# Patient Record
Sex: MALE | Race: OTHER | NOT HISPANIC OR LATINO
[De-identification: names, ages, dates, MRNs, and addresses within clinical notes are randomized per-mention and may not be internally consistent; named-entity substitution may affect disease eponyms.]

---

## 2017-11-09 ENCOUNTER — APPOINTMENT (OUTPATIENT)
Dept: ORTHOPEDIC SURGERY | Facility: CLINIC | Age: 77
End: 2017-11-09
Payer: MEDICARE

## 2017-11-09 DIAGNOSIS — Z96.651 PRESENCE OF RIGHT ARTIFICIAL KNEE JOINT: ICD-10-CM

## 2017-11-09 DIAGNOSIS — Z96.652 PRESENCE OF LEFT ARTIFICIAL KNEE JOINT: ICD-10-CM

## 2017-11-09 PROCEDURE — 73562 X-RAY EXAM OF KNEE 3: CPT | Mod: 50

## 2017-11-09 PROCEDURE — 99213 OFFICE O/P EST LOW 20 MIN: CPT

## 2019-02-28 ENCOUNTER — APPOINTMENT (OUTPATIENT)
Dept: MRI IMAGING | Facility: CLINIC | Age: 79
End: 2019-02-28
Payer: MEDICARE

## 2019-02-28 ENCOUNTER — OUTPATIENT (OUTPATIENT)
Dept: OUTPATIENT SERVICES | Facility: HOSPITAL | Age: 79
LOS: 1 days | End: 2019-02-28
Payer: MEDICARE

## 2019-02-28 DIAGNOSIS — Z96.652 PRESENCE OF LEFT ARTIFICIAL KNEE JOINT: Chronic | ICD-10-CM

## 2019-02-28 DIAGNOSIS — Z00.8 ENCOUNTER FOR OTHER GENERAL EXAMINATION: ICD-10-CM

## 2019-02-28 PROCEDURE — 73721 MRI JNT OF LWR EXTRE W/O DYE: CPT | Mod: 26,LT

## 2019-02-28 PROCEDURE — 73721 MRI JNT OF LWR EXTRE W/O DYE: CPT

## 2020-02-25 ENCOUNTER — APPOINTMENT (OUTPATIENT)
Dept: VASCULAR SURGERY | Facility: CLINIC | Age: 80
End: 2020-02-25
Payer: MEDICARE

## 2020-02-25 VITALS
DIASTOLIC BLOOD PRESSURE: 71 MMHG | WEIGHT: 158 LBS | HEIGHT: 69 IN | SYSTOLIC BLOOD PRESSURE: 127 MMHG | BODY MASS INDEX: 23.4 KG/M2 | HEART RATE: 74 BPM

## 2020-02-25 PROCEDURE — 99203 OFFICE O/P NEW LOW 30 MIN: CPT

## 2020-02-25 PROCEDURE — 93924 LWR XTR VASC STDY BILAT: CPT

## 2020-02-25 NOTE — CONSULT LETTER
[Consult Letter:] : I had the pleasure of evaluating your patient, [unfilled]. [Dear  ___] : Dear ~TESFAYE, [Please see my note below.] : Please see my note below. [Sincerely,] : Sincerely, [Consult Closing:] : Thank you very much for allowing me to participate in the care of this patient.  If you have any questions, please do not hesitate to contact me. [FreeTextEntry3] : Ganesh Perez M.D., FVICENTE., R.P.V.I.\par \par  [facility]\par  of  Surgery\par Assistant Professor of Radiology\par [facility] at [place]\par

## 2020-02-25 NOTE — PHYSICAL EXAM
[Normal Rate and Rhythm] : normal rate and rhythm [Normal Breath Sounds] : Normal breath sounds [2+] : right 2+ [No Rash or Lesion] : No rash or lesion [Alert] : alert [Calm] : calm [JVD] : no jugular venous distention  [Ankle Swelling (On Exam)] : not present [Varicose Veins Of Lower Extremities] : not present [Abdomen Tenderness] : ~T ~M No abdominal tenderness [Skin Ulcer] : no ulcer [] : not present [de-identified] : appears well

## 2020-02-25 NOTE — HISTORY OF PRESENT ILLNESS
[FreeTextEntry1] : 79-year-old gentleman with a history of diabetes presents the office complaining of pain in his second and third digit of the right hand. He notes that the fingers become discolored when the hand becomes cold. While the hand is warm there are no pain in the fingers. Patient is a nonsmoker. He is here for evaluation.

## 2020-02-25 NOTE — ASSESSMENT
[FreeTextEntry1] : the patient underwent arterial Dopplers of the finger with cold water immersion which Was positive for Raynaud's phenomena. At this time no medical intervention is necessary. Would recommend keeping hands and feet warm

## 2020-11-27 ENCOUNTER — OUTPATIENT (OUTPATIENT)
Dept: OUTPATIENT SERVICES | Facility: HOSPITAL | Age: 80
LOS: 1 days | End: 2020-11-27
Payer: MEDICARE

## 2020-11-27 ENCOUNTER — APPOINTMENT (OUTPATIENT)
Dept: RADIOLOGY | Facility: IMAGING CENTER | Age: 80
End: 2020-11-27
Payer: MEDICARE

## 2020-11-27 DIAGNOSIS — Z00.8 ENCOUNTER FOR OTHER GENERAL EXAMINATION: ICD-10-CM

## 2020-11-27 DIAGNOSIS — Z96.652 PRESENCE OF LEFT ARTIFICIAL KNEE JOINT: Chronic | ICD-10-CM

## 2020-11-27 PROCEDURE — 73630 X-RAY EXAM OF FOOT: CPT

## 2020-11-27 PROCEDURE — 73630 X-RAY EXAM OF FOOT: CPT | Mod: 26,LT

## 2022-07-18 ENCOUNTER — APPOINTMENT (OUTPATIENT)
Dept: ORTHOPEDIC SURGERY | Facility: CLINIC | Age: 82
End: 2022-07-18

## 2022-07-18 VITALS
WEIGHT: 160 LBS | HEIGHT: 69 IN | BODY MASS INDEX: 23.7 KG/M2 | DIASTOLIC BLOOD PRESSURE: 78 MMHG | SYSTOLIC BLOOD PRESSURE: 142 MMHG | HEART RATE: 61 BPM

## 2022-07-18 DIAGNOSIS — M19.131 POST-TRAUMATIC OSTEOARTHRITIS, RIGHT WRIST: ICD-10-CM

## 2022-07-18 DIAGNOSIS — M25.531 PAIN IN RIGHT WRIST: ICD-10-CM

## 2022-07-18 DIAGNOSIS — M19.031 PRIMARY OSTEOARTHRITIS, RIGHT WRIST: ICD-10-CM

## 2022-07-18 DIAGNOSIS — M25.831 OTHER SPECIFIED JOINT DISORDERS, RIGHT WRIST: ICD-10-CM

## 2022-07-18 PROCEDURE — 73110 X-RAY EXAM OF WRIST: CPT | Mod: RT

## 2022-07-18 PROCEDURE — 99203 OFFICE O/P NEW LOW 30 MIN: CPT

## 2022-07-18 RX ORDER — LATANOPROST/PF 0.005 %
0.01 DROPS OPHTHALMIC (EYE)
Qty: 10 | Refills: 0 | Status: ACTIVE | COMMUNITY
Start: 2022-02-27

## 2022-07-18 RX ORDER — BRIMONIDINE TARTRATE 2 MG/MG
0.2 SOLUTION/ DROPS OPHTHALMIC
Qty: 15 | Refills: 0 | Status: ACTIVE | COMMUNITY
Start: 2021-08-18

## 2022-07-18 RX ORDER — AMOXICILLIN 500 MG/1
500 TABLET, FILM COATED ORAL
Qty: 30 | Refills: 0 | Status: COMPLETED | COMMUNITY
Start: 2022-05-11

## 2022-07-18 RX ORDER — TOBRAMYCIN 3 MG/ML
0.3 SOLUTION/ DROPS OPHTHALMIC
Qty: 5 | Refills: 0 | Status: ACTIVE | COMMUNITY
Start: 2022-05-13

## 2022-07-18 RX ORDER — ATORVASTATIN CALCIUM 10 MG/1
10 TABLET, FILM COATED ORAL
Qty: 90 | Refills: 0 | Status: ACTIVE | COMMUNITY
Start: 2022-05-17

## 2022-07-19 PROBLEM — M19.131 SLAC (SCAPHOLUNATE ADVANCED COLLAPSE) WRIST, RIGHT: Status: ACTIVE | Noted: 2022-07-19

## 2022-07-19 PROBLEM — M25.831 ULNAR ABUTMENT SYNDROME OF RIGHT WRIST: Status: ACTIVE | Noted: 2022-07-19

## 2022-07-19 PROBLEM — M19.031 ARTHRITIS OF SCAPHOID-TRAPEZIUM-TRAPEZOID JOINT OF RIGHT HAND: Status: ACTIVE | Noted: 2022-07-19

## 2022-07-19 NOTE — HISTORY OF PRESENT ILLNESS
[FreeTextEntry1] : Patient is 80 yo RHD male retired  with history of diabetes noted to have Raynaud's phenomena by Dr. Perez, vascular surgeon, 2.5 years ago.\par Patient had undergone knee replacement.\par Even so, patient states that he exercises regularly.\par Patient complains of right ulnar wrist pain associated with exercising especially heavier lifting and exercises.\par \par Patient presents for hand evaluation.\par Patient reports that his primary complaint is feeling discomfort along the ulnar aspect of the wrist.\par Patient describes swelling at the ulnar aspect of right wrist..\par Patient reports that he exercises at a gym 4 days/week.\par Patient perceives pain in the region of swelling on the ulnar aspect of the wrist.\par Until it was demonstrated to him the patient was not aware of tenderness in STT joint or in the radial aspect of wrist.\par Occasional pain at night\par Occasional pain during exercise\par When the patient flexes wrist with 30 to 35 pounds of weight during curls or wrist curls he has pain\par Patient denies numbness, tingling, triggering.\par Patient not consciously aware of traumatic injury.\par Patient has no left hand or wrist complaints.\par \par Patient states that there are many physicians in his family.\par For example, patient's son is a urologist for female pelvic medicine and reconstruction/.\par Blayne Inman MD \par [facility]\par [address], [address]\[place] [address]\par \par

## 2022-07-19 NOTE — PHYSICAL EXAM
[de-identified] : Right wrist:\par E/F: 50/55 degrees no pain\par Radial/ulnar deviation no pain\par Soft swelling ulnar mid axis between ulna and triquetrum and volarly towards pisiform\par Tenderness in the area of the swelling no redness or heat\par No tenderness FCU\par No tenderness ECU\par DRUJ dorsal volar stress test good stability no pain in pronation, supination, neutral\par Ulnar carpal dorsal volar stress does not seem to cause pain\par LT ballottement does not cause pain good stability\par After reviewing radiographs with patient focused exam of ulnar lunate abutment with manipulation did cause pain and recreates symptoms\par 4mm nodule dorsally over ECU between ulnar styloid and fifth metacarpal base possibly small cyst nontender\par Scaphoid shift test: Good stability, slight crepitus, slight pain perceived radially\par Slight tenderness radial scaphoid joint space dorsally\par STT manipulation: Stable, slight pain, recreating symptoms\par \par Right hand\par Carpal tunnel surgical scar\par 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\par MP3 -25 deg\par There is no other A1 pulley tenderness or triggering in any other finger, right hand.\par No pertinent MP, PIP, or DIP joint contributory findings.\par \par Left wrist\par Extension/flexion 50/50 degrees without pain\par No tenderness including the ulnar carpus\par No ulnar swelling\par ECU, FCU no positive finding\par DRUJ dorsal volar stress test and manipulation no pain\par Scaphoid shift test good stability no pain.\par No other notable wrist findings\par Left basal joint adducted, stiff, enlarged, no crepitus no pain\par \par Left hand\par No A1 pulley tenderness and no triggering in any finger.\par No pertinent MP, PIP, or DIP joint contributory findings.\par \par Neurologic: Median, ulnar, and radial motor and sensory are intact. \par Phalen's test with median nerve compression: negative. \par Skin: No cyanosis, clubbing, edema or rashes.\par Vascular: Radial pulses intact.\par Lymphatic: No streaking or epitrochlear adenopathy.\par The patient is awake, alert, and oriented. Affect appropriate. Cooperative.  [de-identified] : Radiographs ordered and interpreted by me today, reviewed and discussed with the patient today.\par \par 5 views right hand and wrist\par Loss of radial scaphoid joint space.\par Widening of scapholunate interval nearly 4 to 5 mm.\par Possible slight scaphoid capitate narrowing.\par Basal joint space maintained without notable degenerative changes.\par Radiolunate space maintained.\par Ulnar neutral at rest.\par Ulnar positive for 1 to 2 mm with power fist view\par Possible narrowing MP3 joint space.  Otherwise MP joint spaces maintained.\par PIP and DIP joints not visualized fully.\par No fractures or dislocations.\par

## 2022-07-19 NOTE — DISCUSSION/SUMMARY
[FreeTextEntry1] : The patient has right wrist pain.  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  I have advised patient to modify exercise activities understanding that he has multiple arthritic areas within the right wrist and that anyone can cause pain.  Undoubtedly a combination of 2 or 3 will cause pain.\par Wrist support splint provided to be worn as needed.\par Long-term prognosis is limited.\par Patient declines medication or other treatments at this time.\par Education provided utilizing radiographs and other educational tools.\par Return as needed\par A lengthy and detailed discussion was held with the patient regarding analysis, treatment, and recommendations. All questions have been answered. At the conclusion the patient expressed acceptance, understanding and agreement with the plan.

## 2024-02-08 ENCOUNTER — APPOINTMENT (OUTPATIENT)
Dept: OTOLARYNGOLOGY | Facility: CLINIC | Age: 84
End: 2024-02-08
Payer: MEDICARE

## 2024-02-08 VITALS
HEART RATE: 76 BPM | DIASTOLIC BLOOD PRESSURE: 60 MMHG | SYSTOLIC BLOOD PRESSURE: 93 MMHG | HEIGHT: 69 IN | WEIGHT: 162 LBS | BODY MASS INDEX: 23.99 KG/M2

## 2024-02-08 DIAGNOSIS — H93.293 OTHER ABNORMAL AUDITORY PERCEPTIONS, BILATERAL: ICD-10-CM

## 2024-02-08 DIAGNOSIS — H61.22 IMPACTED CERUMEN, LEFT EAR: ICD-10-CM

## 2024-02-08 DIAGNOSIS — H90.A32 MIXED CONDUCTIVE AND SENSORINEURAL HEARING, UNILATERAL, LEFT EAR WITH RESTRICTED HEARING ON THE  CONTRALATERAL SIDE: ICD-10-CM

## 2024-02-08 DIAGNOSIS — H90.3 SENSORINEURAL HEARING LOSS, BILATERAL: ICD-10-CM

## 2024-02-08 DIAGNOSIS — H90.A31 MIXED CONDUCTIVE AND SENSORINEURAL HEARING LOSS, UNILATERAL, RIGHT EAR WITH RESTRICTED HEARING ON THE  CONTRALATERAL SIDE: ICD-10-CM

## 2024-02-08 PROCEDURE — 92567 TYMPANOMETRY: CPT

## 2024-02-08 PROCEDURE — 99203 OFFICE O/P NEW LOW 30 MIN: CPT | Mod: 25

## 2024-02-08 PROCEDURE — 69210 REMOVE IMPACTED EAR WAX UNI: CPT

## 2024-02-08 PROCEDURE — 92557 COMPREHENSIVE HEARING TEST: CPT

## 2024-02-08 NOTE — ASSESSMENT
[FreeTextEntry1] : Otoscopic exam shows intact bilateral tympanic membranes without effusion.  Left tympanic membrane with retraction onto the incudostapedial joint.  Bilateral facial nerve function normal.  I reviewed an audiogram for hearing loss which shows a left mixed hearing loss and right sensorineural hearing loss with symmetric bone-conduction thresholds.  Tympanometry is type C on the left side.  Provided medical clearance for hearing aids bilaterally.  Monitor hearing annually.  Discussed association between hearing loss and cognitive impairment.

## 2024-02-08 NOTE — PROCEDURE
[FreeTextEntry3] : Procedure note:  Left cerumenectomy  Description of Procedure:  Cerumen impaction was noted requiring debridement under the operating microscope using otologic instrumentation.  The patient tolerated the procedure without complications.

## 2024-02-08 NOTE — HISTORY OF PRESENT ILLNESS
[de-identified] : 82 y/o M presents for evaluation of bilateral hearing loss reports hearing loss has been gradually progressive but has been the worst in the past 6 months interested in hearing aids; has not used them in the past; no hx of ear infections or prior ear surgeries Patient denies otalgia, otorrhea, ear infections, dizziness, vertigo, headaches related to hearing.

## 2025-05-08 ENCOUNTER — APPOINTMENT (OUTPATIENT)
Dept: RADIOLOGY | Facility: IMAGING CENTER | Age: 85
End: 2025-05-08
Payer: MEDICARE

## 2025-05-08 ENCOUNTER — OUTPATIENT (OUTPATIENT)
Dept: OUTPATIENT SERVICES | Facility: HOSPITAL | Age: 85
LOS: 1 days | End: 2025-05-08
Payer: MEDICARE

## 2025-05-08 PROCEDURE — 71046 X-RAY EXAM CHEST 2 VIEWS: CPT

## 2025-05-08 PROCEDURE — 71046 X-RAY EXAM CHEST 2 VIEWS: CPT | Mod: 26

## 2025-08-19 ENCOUNTER — APPOINTMENT (OUTPATIENT)
Dept: ELECTROPHYSIOLOGY | Facility: CLINIC | Age: 85
End: 2025-08-19
Payer: MEDICARE

## 2025-08-19 ENCOUNTER — NON-APPOINTMENT (OUTPATIENT)
Age: 85
End: 2025-08-19

## 2025-08-19 VITALS
OXYGEN SATURATION: 100 % | BODY MASS INDEX: 22.14 KG/M2 | HEART RATE: 70 BPM | SYSTOLIC BLOOD PRESSURE: 122 MMHG | RESPIRATION RATE: 16 BRPM | DIASTOLIC BLOOD PRESSURE: 73 MMHG | WEIGHT: 149.5 LBS | HEIGHT: 69 IN | TEMPERATURE: 97.7 F

## 2025-08-19 DIAGNOSIS — E11.9 TYPE 2 DIABETES MELLITUS W/OUT COMPLICATIONS: ICD-10-CM

## 2025-08-19 PROCEDURE — 99204 OFFICE O/P NEW MOD 45 MIN: CPT

## 2025-08-19 PROCEDURE — G2211 COMPLEX E/M VISIT ADD ON: CPT

## 2025-08-19 PROCEDURE — 93000 ELECTROCARDIOGRAM COMPLETE: CPT

## 2025-08-19 RX ORDER — EMPAGLIFLOZIN 25 MG/1
25 TABLET, FILM COATED ORAL
Qty: 90 | Refills: 1 | Status: ACTIVE | COMMUNITY
Start: 2025-08-19

## 2025-08-19 RX ORDER — ATORVASTATIN CALCIUM 20 MG/1
20 TABLET, FILM COATED ORAL
Qty: 1 | Refills: 2 | Status: ACTIVE | COMMUNITY
Start: 2025-08-19

## 2025-08-19 RX ORDER — GLIMEPIRIDE 2 MG/1
2 TABLET ORAL
Qty: 180 | Refills: 3 | Status: ACTIVE | COMMUNITY
Start: 2025-08-19

## 2025-08-19 RX ORDER — METFORMIN HYDROCHLORIDE 1000 MG/1
1000 TABLET, COATED ORAL
Qty: 180 | Refills: 4 | Status: ACTIVE | COMMUNITY
Start: 2025-08-19

## 2025-08-27 DIAGNOSIS — I48.91 UNSPECIFIED ATRIAL FIBRILLATION: ICD-10-CM
